# Patient Record
Sex: FEMALE | Race: BLACK OR AFRICAN AMERICAN | Employment: FULL TIME | URBAN - METROPOLITAN AREA
[De-identification: names, ages, dates, MRNs, and addresses within clinical notes are randomized per-mention and may not be internally consistent; named-entity substitution may affect disease eponyms.]

---

## 2019-04-16 ENCOUNTER — HOSPITAL ENCOUNTER (EMERGENCY)
Age: 44
Discharge: HOME | End: 2019-04-16
Payer: COMMERCIAL

## 2019-04-16 ENCOUNTER — APPOINTMENT (OUTPATIENT)
Dept: CT IMAGING | Age: 44
End: 2019-04-16
Payer: COMMERCIAL

## 2019-04-16 VITALS
SYSTOLIC BLOOD PRESSURE: 164 MMHG | HEART RATE: 78 BPM | DIASTOLIC BLOOD PRESSURE: 113 MMHG | TEMPERATURE: 98.2 F | OXYGEN SATURATION: 99 % | RESPIRATION RATE: 16 BRPM

## 2019-04-16 DIAGNOSIS — M54.2 NECK PAIN: ICD-10-CM

## 2019-04-16 DIAGNOSIS — S09.90XA INJURY OF HEAD, INITIAL ENCOUNTER: Primary | ICD-10-CM
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
## 2019-04-16 PROCEDURE — 70450 CT HEAD/BRAIN W/O DYE: CPT

## 2019-04-16 PROCEDURE — 6370000000 HC RX 637 (ALT 250 FOR IP): Performed by: PHYSICIAN ASSISTANT

## 2019-04-16 PROCEDURE — 99284 EMERGENCY DEPT VISIT MOD MDM: CPT

## 2019-04-16 PROCEDURE — 72125 CT NECK SPINE W/O DYE: CPT

## 2019-04-16 RX ORDER — LIDOCAINE 4 G/G
1 PATCH TOPICAL DAILY
Status: DISCONTINUED | OUTPATIENT
Start: 2019-04-16 | End: 2019-04-16 | Stop reason: HOSPADM

## 2019-04-16 RX ORDER — ACETAMINOPHEN 500 MG
1000 TABLET ORAL ONCE
Status: COMPLETED | OUTPATIENT
Start: 2019-04-16 | End: 2019-04-16

## 2019-04-16 RX ORDER — NAPROXEN 500 MG/1
500 TABLET ORAL 2 TIMES DAILY
Qty: 20 TABLET | Refills: 0 | Status: SHIPPED | OUTPATIENT
Start: 2019-04-16 | End: 2019-04-26

## 2019-04-16 RX ORDER — CYCLOBENZAPRINE HCL 10 MG
10 TABLET ORAL 3 TIMES DAILY PRN
Qty: 20 TABLET | Refills: 0 | Status: SHIPPED | OUTPATIENT
Start: 2019-04-16 | End: 2019-04-26

## 2019-04-16 RX ADMIN — ACETAMINOPHEN 1000 MG: 500 TABLET, FILM COATED ORAL at 13:27

## 2019-04-16 SDOH — HEALTH STABILITY: MENTAL HEALTH: HOW OFTEN DO YOU HAVE A DRINK CONTAINING ALCOHOL?: NEVER

## 2019-04-16 ASSESSMENT — PAIN SCALES - GENERAL
PAINLEVEL_OUTOF10: 8
PAINLEVEL_OUTOF10: 8

## 2019-04-16 ASSESSMENT — ENCOUNTER SYMPTOMS
ABDOMINAL PAIN: 0
BACK PAIN: 0
NAUSEA: 0
VOMITING: 0
PHOTOPHOBIA: 0
SHORTNESS OF BREATH: 0

## 2019-04-16 NOTE — ED PROVIDER NOTES
**EVALUATED BY ADVANCED PRACTICE PROVIDER**        [address]  eMERGENCY dEPARTMENT eNCOUnter      Pt Name: Cynthia KING:[id number]  [place] 1975  Date of evaluation: 4/16/2019  Provider: Casa Anderson PA-C      Chief Complaint:    Chief Complaint   Patient presents with    Fall     fell back and hit head at work and now neck pain, patient reporting this happened yesterday and she feels off, and already had drug screen yesterday after the incident       Nursing Notes, Past Medical Hx, Past Surgical Hx, Social Hx, Allergies, and Family Hx were all reviewed and agreed with or any disagreements were addressed in the HPI.    HPI:  (Location, Duration, Timing, Severity,Quality, Assoc Sx, Context, Modifying factors)  This is a  37 y.o. female that presents to the emergency department with a chief complaint of some neck pain and headache after she fell at work yesterday. Patient states she was carrying something when she fell backwards hitting the floor. She believes she may have passed out but she is unsure and if she did pass out she is unsure how long she was unconscious for. She states she just feels off today. Denies use of anticoagulants, nausea, vomiting, difficulty moving her extremities, numbness, chest pain, shortness breath, abdominal pain, visual changes, wound or any other symptoms. Rates the pain an 8 out of 10. Droperidol to the emergency department. Denies any other symptoms. PastMedical/Surgical History:  No past medical history on file. No past surgical history on file. Medications:  Discharge Medication List as of 4/16/2019  2:23 PM            Review of Systems:  Review of Systems   Constitutional: Negative for chills and fever. Eyes: Negative for photophobia and visual disturbance. Respiratory: Negative for shortness of breath. Cardiovascular: Negative for chest pain.    Gastrointestinal: Negative for abdominal pain, nausea and vomiting. Genitourinary: Negative for dysuria and hematuria. Musculoskeletal: Positive for neck pain. Negative for back pain and gait problem. Skin: Negative for rash and wound. Neurological: Positive for headaches. Negative for speech difficulty and numbness. Psychiatric/Behavioral: Negative for confusion. Positives and Pertinent negatives as per HPI. Except as noted above in the ROS, problem specific ROS was completed and is negative. Physical Exam:  Physical Exam   Constitutional: She is oriented to person, place, and time. She appears well-developed and well-nourished. Patient witnessed walking in from the waiting room to her room without any difficulties or abnormalities. Nontoxic in appearance. HENT:   Head: Atraumatic. Right Ear: External ear normal.   Left Ear: External ear normal.   No hemotympanum, septal hematoma or CSF rhinorrhea. No raccoon eyes or ayala sign. Eyes: Pupils are equal, round, and reactive to light. EOM are normal. Right eye exhibits no discharge. Left eye exhibits no discharge. Neck: Normal range of motion. Cardiovascular: Normal rate, regular rhythm and normal heart sounds. Exam reveals no gallop and no friction rub. No murmur heard. Pulmonary/Chest: Effort normal and breath sounds normal. No stridor. No respiratory distress. She has no wheezes. She has no rales. Abdominal: Soft. Bowel sounds are normal. She exhibits no distension and no mass. There is no tenderness. There is no rebound and no guarding. No hernia. Musculoskeletal: Normal range of motion. She exhibits tenderness. She exhibits no edema or deformity. Generalized subjective tenderness to palpation of the paracervical muscles. No midline tenderness or step-off in the neck or back. No point tenderness. Gross full range of motion throughout all 4 extremity is. Neurological: She is alert and oriented to person, place, and time. No cranial nerve deficit. Skin: Skin is warm and dry. No rash noted. She is not diaphoretic. No erythema. Psychiatric: She has a normal mood and affect. Her behavior is normal.   Nursing note and vitals reviewed. MEDICAL DECISION MAKING    Vitals:    Vitals:    04/16/19 1309   BP: (!) 164/113   Pulse: 78   Resp: 16   Temp: 98.2 °F (36.8 °C)   TempSrc: Infrared   SpO2: 99%       LABS:Labs Reviewed - No data to display     Remainder of labs reviewed and werenegative at this time or not returned at the time of this note. RADIOLOGY:   Non-plain film images such as CT, Ultrasound and MRI are read by the radiologist. Zack Ruiz PA-C have directly visualized the radiologic plain film image(s) with the below findings:        Interpretation per the Radiologist below, if available at the time of thisnote:    CT Cervical Spine WO Contrast   Final Result   No acute abnormality of the cervical spine with chronic/degenerative findings   as described. CT Head WO Contrast   Final Result   1. Unremarkable appearance of the brain parenchyma with no acute intracranial   abnormality. 2. Sinusitis most pronounced in the right maxillary sinus as described. Ct Head Wo Contrast    Result Date: 4/16/2019  EXAMINATION: CT OF THE HEAD WITHOUT CONTRAST  4/16/2019 1:52 pm TECHNIQUE: CT of the head was performed without the administration of intravenous contrast. Dose modulation, iterative reconstruction, and/or weight based adjustment of the mA/kV was utilized to reduce the radiation dose to as low as reasonably achievable. COMPARISON: None. HISTORY: ORDERING SYSTEM PROVIDED HISTORY: Head injury with possible loss of consciousness TECHNOLOGIST PROVIDED HISTORY: If patient is on cardiac monitor and/or pulse ox, they may be taken off cardiac monitor and pulse ox, left on O2 if currently on. All monitors reattached when patient returns to room. Has a \"code stroke\" or \"stroke alert\" been called? ->No Ordering Physician Provided Reason for Exam: fall Acuity: Acute Type of Exam: Initial FINDINGS: BRAIN/VENTRICLES: No acute intracranial hemorrhage, mass effect or midline shift. No abnormal extra-axial fluid collection. The gray-white differentiation is maintained without evidence of an acute infarct. No evidence of hydrocephalus. ORBITS: The visualized portion of the orbits demonstrate no acute abnormality. SINUSES: Moderate to severe mucosal thickening of the right maxillary sinus with mild hyperdensity likely inspissated secretions. Moderate mucosal thickening of the bilateral ethmoid sinuses. Otherwise sinuses and mastoids are well aerated. SOFT TISSUES/SKULL:  No acute abnormality of the visualized skull or soft tissues. 1. Unremarkable appearance of the brain parenchyma with no acute intracranial abnormality. 2. Sinusitis most pronounced in the right maxillary sinus as described. MEDICAL DECISION MAKING / ED COURSE:      PROCEDURES:   Procedures    None    Patient was given:     Medications   lidocaine 4 % external patch 1 patch (1 patch Transdermal Patch Applied 4/16/19 1327)   acetaminophen (TYLENOL) tablet 1,000 mg (1,000 mg Oral Given 4/16/19 1327)       Patient presented with headache and neck pain after she fell and hit her head yesterday. Questionable loss of consciousness. She is grossly neurologically intact here and nontoxic in appearance. CT imaging reveals no acute abnormalities. There is no wound. Low suspicion for subdural hematoma, epidural hematoma, skull fracture, vertebral fracture, cord injury, cauda equina, epidural abscess or other emergent etiology. Suspect head injury with mild concussive syndrome and cervical strain given mechanism. She returns with anti-inflammatories and muscle relaxants. Follow up with [id number] [employer] as this happened at work. Return here for any worsening of symptoms or problems at home. The patient tolerated their visit well. I evaluated the patient.   The physician was available for consultation as needed. The patient and / or the family were informed of the results of anytests, a time was given to answer questions, a plan was proposed and they agreed with plan. CLINICAL IMPRESSION:  1. Injury of head, initial encounter    2. Neck pain    3.  Fall, initial encounter        DISPOSITION Decision To Discharge 04/16/2019 02:18:50 PM      PATIENT REFERRED TO:  *[address]  [phone]    Schedule an appointment as soon as possible for a visit in 1 day  For re-check    [facility] Emergency Department  [address] [place] [address]  [phone]    As needed      DISCHARGE MEDICATIONS:  Discharge Medication List as of 4/16/2019  2:23 PM      START taking these medications    Details   naproxen (NAPROSYN) 500 MG tablet Take 1 tablet by mouth 2 times daily for 20 doses, Disp-20 tablet, R-0Print      cyclobenzaprine (FLEXERIL) 10 MG tablet Take 1 tablet by mouth 3 times daily as needed for Muscle spasms, Disp-20 tablet, R-0Print             DISCONTINUED MEDICATIONS:  Discharge Medication List as of 4/16/2019  2:23 PM                 (Please note the MDM and HPI sections of this note were completed with a voice recognition program.  Efforts weremade to edit the dictations but occasionally words are mis-transcribed.)    Electronically signed, Carolyn Pink PA-C,          Carolyn Pink PA-C  04/16/19 4572

## 2019-04-16 NOTE — LETTER
[facility] Emergency Department  [address] [facility], [address]             April 16, 2019    Patient: Alek Lipps   YOB: 1975   Date of Visit: 4/16/2019       To Whom It May Concern:    Evan Vigil was seen and treated in our emergency department on 4/16/2019. She may return to work on 4/18/19.       Sincerely,         Aditi Morgan RN

## 2021-01-20 ENCOUNTER — OFFICE VISIT (OUTPATIENT)
Dept: PRIMARY CARE CLINIC | Age: 46
End: 2021-01-20
Payer: COMMERCIAL

## 2021-01-20 DIAGNOSIS — Z20.828 EXPOSURE TO SARS-ASSOCIATED CORONAVIRUS: Primary | ICD-10-CM

## 2021-01-20 PROCEDURE — 99211 OFF/OP EST MAY X REQ PHY/QHP: CPT | Performed by: NURSE PRACTITIONER

## 2021-01-20 NOTE — PROGRESS NOTES
[employer] received a viral test for COVID-19. They were educated on isolation and quarantine as appropriate. For any symptoms, they were directed to seek care from their PCP, given contact information to establish with a doctor, directed to an urgent care or the emergency room.

## 2021-01-20 NOTE — PATIENT INSTRUCTIONS

## 2021-01-21 LAB — SARS-COV-2, NAA: NOT DETECTED
